# Patient Record
Sex: MALE | Race: WHITE | NOT HISPANIC OR LATINO | Employment: OTHER | URBAN - METROPOLITAN AREA
[De-identification: names, ages, dates, MRNs, and addresses within clinical notes are randomized per-mention and may not be internally consistent; named-entity substitution may affect disease eponyms.]

---

## 2023-04-06 LAB
6-ACETYLMORPHINE: <25 NG/ML
7-AMINOCLONAZEPAM: <25 NG/ML
ALPHA-HYDROXYALPRAZOLAM: <25 NG/ML
ALPHA-HYDROXYMIDAZOLAM: <25 NG/ML
ALPRAZOLAM: <25 NG/ML
AMPHETAMINE (PRESENCE) IN URINE BY SCREEN METHOD: ABNORMAL
BARBITURATES PRESENCE IN URINE BY SCREEN METHOD: ABNORMAL
CANNABINOIDS IN URINE BY SCREEN METHOD: ABNORMAL
CHLORDIAZEPOXIDE: <25 NG/ML
CLONAZEPAM: <25 NG/ML
COCAINE (PRESENCE) IN URINE BY SCREEN METHOD: ABNORMAL
CODEINE: <50 NG/ML
CREATINE, URINE FOR DRUG: 197.8 MG/DL
DIAZEPAM: <25 NG/ML
DRUG SCREEN COMMENT URINE: ABNORMAL
EDDP: <25 NG/ML
FENTANYL CONFIRMATION, URINE: <2.5 NG/ML
HYDROCODONE: >2500 NG/ML
HYDROMORPHONE: 442 NG/ML
LORAZEPAM: <25 NG/ML
METHADONE CONFIRMATION,URINE: <25 NG/ML
MIDAZOLAM: <25 NG/ML
MORPHINE URINE: <50 NG/ML
NORDIAZEPAM: <25 NG/ML
NORFENTANYL: <2.5 NG/ML
NORHYDROCODONE: >1000 NG/ML
NOROXYCODONE: <25 NG/ML
O-DESMETHYLTRAMADOL: <50 NG/ML
OXAZEPAM: <25 NG/ML
OXYCODONE: <25 NG/ML
OXYMORPHONE: <25 NG/ML
PHENCYCLIDINE (PRESENCE) IN URINE BY SCREEN METHOD: ABNORMAL
TEMAZEPAM: <25 NG/ML
TRAMADOL: <50 NG/ML
ZOLPIDEM METABOLITE (ZCA): <25 NG/ML
ZOLPIDEM: <25 NG/ML

## 2023-08-18 PROBLEM — F32.A DEPRESSION: Status: ACTIVE | Noted: 2023-08-18

## 2023-08-18 PROBLEM — K50.00 CROHN'S DISEASE OF SMALL INTESTINE (MULTI): Status: ACTIVE | Noted: 2023-08-18

## 2023-08-18 PROBLEM — M79.671 CHRONIC PAIN OF BOTH FEET: Status: ACTIVE | Noted: 2023-08-18

## 2023-08-18 PROBLEM — R74.8 LIVER ENZYME ELEVATION: Status: ACTIVE | Noted: 2023-08-18

## 2023-08-18 PROBLEM — R20.2 PARESTHESIA OF BOTH FEET: Status: ACTIVE | Noted: 2023-08-18

## 2023-08-18 PROBLEM — M19.041 PRIMARY OSTEOARTHRITIS OF BOTH HANDS: Status: ACTIVE | Noted: 2023-08-18

## 2023-08-18 PROBLEM — R79.9 ABNORMAL BLOOD CHEMISTRY: Status: ACTIVE | Noted: 2023-08-18

## 2023-08-18 PROBLEM — R07.9 CHEST PAIN: Status: ACTIVE | Noted: 2023-08-18

## 2023-08-18 PROBLEM — R20.0 NUMBNESS: Status: ACTIVE | Noted: 2023-08-18

## 2023-08-18 PROBLEM — N28.9 RENAL DISORDER: Status: ACTIVE | Noted: 2023-08-18

## 2023-08-18 PROBLEM — M19.042 PRIMARY OSTEOARTHRITIS OF BOTH HANDS: Status: ACTIVE | Noted: 2023-08-18

## 2023-08-18 PROBLEM — R94.31 ABNORMAL EKG: Status: ACTIVE | Noted: 2023-08-18

## 2023-08-18 PROBLEM — M15.9 OSTEOARTHRITIS, MULTIPLE SITES: Status: ACTIVE | Noted: 2023-08-18

## 2023-08-18 PROBLEM — R20.2 PARESTHESIA OF BOTH HANDS: Status: ACTIVE | Noted: 2023-08-18

## 2023-08-18 PROBLEM — K50.90 CROHN'S DISEASE (MULTI): Status: ACTIVE | Noted: 2023-08-18

## 2023-08-18 PROBLEM — E55.9 VITAMIN D DEFICIENCY: Status: ACTIVE | Noted: 2023-08-18

## 2023-08-18 PROBLEM — B02.9 SHINGLES: Status: ACTIVE | Noted: 2023-08-18

## 2023-08-18 PROBLEM — R22.32 FINGER MASS, LEFT: Status: ACTIVE | Noted: 2023-08-18

## 2023-08-18 PROBLEM — I10 HYPERTENSION: Status: ACTIVE | Noted: 2023-08-18

## 2023-08-18 PROBLEM — E03.9 HYPOTHYROIDISM: Status: ACTIVE | Noted: 2023-08-18

## 2023-08-18 PROBLEM — M94.9 DISORDER OF BONE AND CARTILAGE: Status: ACTIVE | Noted: 2023-08-18

## 2023-08-18 PROBLEM — M07.60 ENTEROPATHIC ARTHRITIS: Status: ACTIVE | Noted: 2023-08-18

## 2023-08-18 PROBLEM — G89.29 CHRONIC PAIN OF BOTH FEET: Status: ACTIVE | Noted: 2023-08-18

## 2023-08-18 PROBLEM — G56.00 CARPAL TUNNEL SYNDROME: Status: ACTIVE | Noted: 2023-08-18

## 2023-08-18 PROBLEM — M85.89 OTHER SPECIFIED DISORDERS OF BONE DENSITY AND STRUCTURE, MULTIPLE SITES: Status: ACTIVE | Noted: 2023-08-18

## 2023-08-18 PROBLEM — F33.1 MODERATE EPISODE OF RECURRENT MAJOR DEPRESSIVE DISORDER (MULTI): Status: ACTIVE | Noted: 2023-08-18

## 2023-08-18 PROBLEM — G89.29 CHRONIC PAIN: Status: ACTIVE | Noted: 2023-08-18

## 2023-08-18 PROBLEM — M79.672 CHRONIC PAIN OF BOTH FEET: Status: ACTIVE | Noted: 2023-08-18

## 2023-08-18 PROBLEM — M89.9 DISORDER OF BONE AND CARTILAGE: Status: ACTIVE | Noted: 2023-08-18

## 2023-08-18 PROBLEM — G47.00 INSOMNIA: Status: ACTIVE | Noted: 2023-08-18

## 2023-08-18 RX ORDER — TRAZODONE HYDROCHLORIDE 50 MG/1
TABLET ORAL
COMMUNITY
Start: 2023-04-03

## 2023-08-18 RX ORDER — NALOXONE HYDROCHLORIDE 4 MG/.1ML
4 SPRAY NASAL AS NEEDED
COMMUNITY
Start: 2020-09-08

## 2023-08-18 RX ORDER — VENLAFAXINE 37.5 MG/1
1 TABLET ORAL 2 TIMES DAILY
COMMUNITY
Start: 2021-06-14 | End: 2024-02-06 | Stop reason: ALTCHOICE

## 2023-08-18 RX ORDER — HYDROCODONE BITARTRATE AND ACETAMINOPHEN 5; 325 MG/1; MG/1
1 TABLET ORAL EVERY 8 HOURS PRN
COMMUNITY
Start: 2022-09-01 | End: 2023-10-02 | Stop reason: SDUPTHER

## 2023-09-06 PROBLEM — M25.541 PAIN OF JOINT OF BOTH HANDS: Status: ACTIVE | Noted: 2023-09-06

## 2023-09-06 PROBLEM — R63.0 LOSS OF APPETITE: Status: ACTIVE | Noted: 2023-09-06

## 2023-09-06 PROBLEM — M79.646 FINGER PAIN: Status: ACTIVE | Noted: 2023-09-06

## 2023-09-06 PROBLEM — K56.0 PARALYTIC ILEUS (MULTI): Status: ACTIVE | Noted: 2023-09-06

## 2023-09-06 PROBLEM — R07.81 RIB PAIN: Status: ACTIVE | Noted: 2023-09-06

## 2023-09-06 PROBLEM — R10.11 RUQ ABDOMINAL PAIN: Status: ACTIVE | Noted: 2023-09-06

## 2023-09-06 PROBLEM — S09.90XA INJURY OF HEAD: Status: ACTIVE | Noted: 2023-09-06

## 2023-09-06 PROBLEM — R21 RASH: Status: ACTIVE | Noted: 2023-09-06

## 2023-09-06 PROBLEM — S61.419A LACERATION OF HAND: Status: ACTIVE | Noted: 2023-09-06

## 2023-09-06 PROBLEM — R10.9 ABDOMINAL PAIN: Status: ACTIVE | Noted: 2023-09-06

## 2023-09-06 PROBLEM — L08.9 INFECTION OF HAND: Status: ACTIVE | Noted: 2023-09-06

## 2023-09-06 PROBLEM — M25.542 PAIN OF JOINT OF BOTH HANDS: Status: ACTIVE | Noted: 2023-09-06

## 2023-09-06 PROBLEM — R11.2 NAUSEA & VOMITING: Status: ACTIVE | Noted: 2023-09-06

## 2023-09-06 PROBLEM — M25.549 PAIN, JOINT, HAND: Status: ACTIVE | Noted: 2023-09-06

## 2023-09-06 PROBLEM — M54.9 BACKACHE: Status: ACTIVE | Noted: 2023-09-06

## 2023-09-06 PROBLEM — M25.539 WRIST PAIN: Status: ACTIVE | Noted: 2023-09-06

## 2023-09-06 PROBLEM — R30.0 DYSURIA: Status: ACTIVE | Noted: 2023-09-06

## 2023-09-06 PROBLEM — I82.90 VENOUS THROMBOSIS: Status: ACTIVE | Noted: 2023-09-06

## 2023-09-06 PROBLEM — E86.0 DEHYDRATION: Status: ACTIVE | Noted: 2023-09-06

## 2023-09-06 RX ORDER — INFLIXIMAB 100 MG/10ML
5 INJECTION, POWDER, LYOPHILIZED, FOR SOLUTION INTRAVENOUS ONCE
COMMUNITY

## 2023-09-06 RX ORDER — DULOXETIN HYDROCHLORIDE 60 MG/1
1 CAPSULE, DELAYED RELEASE ORAL DAILY
COMMUNITY
Start: 2023-08-24 | End: 2024-02-06 | Stop reason: ALTCHOICE

## 2023-10-02 DIAGNOSIS — M07.60 ENTEROPATHIC ARTHRITIS: Primary | ICD-10-CM

## 2023-10-02 RX ORDER — HYDROCODONE BITARTRATE AND ACETAMINOPHEN 5; 325 MG/1; MG/1
1 TABLET ORAL EVERY 8 HOURS PRN
Qty: 120 TABLET | Refills: 0 | Status: SHIPPED | OUTPATIENT
Start: 2023-10-02 | End: 2023-11-01 | Stop reason: SDUPTHER

## 2023-10-03 RX ORDER — HYDROCODONE BITARTRATE AND ACETAMINOPHEN 5; 325 MG/1; MG/1
1 TABLET ORAL EVERY 8 HOURS PRN
Qty: 20 TABLET | Refills: 0 | OUTPATIENT
Start: 2023-10-03

## 2023-10-17 PROBLEM — M46.50: Status: ACTIVE | Noted: 2023-10-17

## 2023-10-24 DIAGNOSIS — M46.50: Primary | ICD-10-CM

## 2023-10-24 RX ORDER — ALBUTEROL SULFATE 0.83 MG/ML
3 SOLUTION RESPIRATORY (INHALATION) AS NEEDED
Status: CANCELLED | OUTPATIENT
Start: 2023-10-25

## 2023-10-24 RX ORDER — EPINEPHRINE 0.3 MG/.3ML
0.3 INJECTION SUBCUTANEOUS EVERY 5 MIN PRN
Status: CANCELLED | OUTPATIENT
Start: 2023-10-25

## 2023-10-24 RX ORDER — FAMOTIDINE 10 MG/ML
20 INJECTION INTRAVENOUS ONCE AS NEEDED
Status: CANCELLED | OUTPATIENT
Start: 2023-10-25

## 2023-10-24 RX ORDER — DIPHENHYDRAMINE HYDROCHLORIDE 50 MG/ML
50 INJECTION INTRAMUSCULAR; INTRAVENOUS AS NEEDED
Status: CANCELLED | OUTPATIENT
Start: 2023-10-25

## 2023-10-25 ENCOUNTER — INFUSION (OUTPATIENT)
Dept: INFUSION THERAPY | Facility: CLINIC | Age: 68
End: 2023-10-25
Payer: MEDICARE

## 2023-10-25 VITALS
DIASTOLIC BLOOD PRESSURE: 78 MMHG | RESPIRATION RATE: 18 BRPM | WEIGHT: 144.6 LBS | TEMPERATURE: 97.5 F | OXYGEN SATURATION: 99 % | HEART RATE: 67 BPM | SYSTOLIC BLOOD PRESSURE: 134 MMHG | BODY MASS INDEX: 20.17 KG/M2

## 2023-10-25 DIAGNOSIS — M46.50: ICD-10-CM

## 2023-10-25 PROCEDURE — 2500000004 HC RX 250 GENERAL PHARMACY W/ HCPCS (ALT 636 FOR OP/ED): Performed by: INTERNAL MEDICINE

## 2023-10-25 PROCEDURE — 96413 CHEMO IV INFUSION 1 HR: CPT | Performed by: INTERNAL MEDICINE

## 2023-10-25 PROCEDURE — 96413 CHEMO IV INFUSION 1 HR: CPT

## 2023-10-25 RX ORDER — ALBUTEROL SULFATE 0.83 MG/ML
3 SOLUTION RESPIRATORY (INHALATION) AS NEEDED
Status: CANCELLED | OUTPATIENT
Start: 2023-12-20

## 2023-10-25 RX ORDER — EPINEPHRINE 0.3 MG/.3ML
0.3 INJECTION SUBCUTANEOUS EVERY 5 MIN PRN
Status: CANCELLED | OUTPATIENT
Start: 2023-12-20

## 2023-10-25 RX ORDER — FAMOTIDINE 10 MG/ML
20 INJECTION INTRAVENOUS ONCE AS NEEDED
Status: CANCELLED | OUTPATIENT
Start: 2023-12-20

## 2023-10-25 RX ORDER — DIPHENHYDRAMINE HYDROCHLORIDE 50 MG/ML
50 INJECTION INTRAMUSCULAR; INTRAVENOUS AS NEEDED
Status: CANCELLED | OUTPATIENT
Start: 2023-12-20

## 2023-10-25 RX ADMIN — SODIUM CHLORIDE 500 MG: 9 INJECTION, SOLUTION INTRAVENOUS at 09:24

## 2023-10-25 ASSESSMENT — ENCOUNTER SYMPTOMS
LOSS OF SENSATION IN FEET: 0
DEPRESSION: 0
OCCASIONAL FEELINGS OF UNSTEADINESS: 0

## 2023-10-25 NOTE — PATIENT INSTRUCTIONS
Today you received: ( free text drug name and dose, including premedication's)  Infliximab    For:   1. Other infective spondylopathies, site unspecified (CMS/[facility])          Please read the  Medication Guide that was given to you and reviewed during todays visit.     (Tell all doctors including dentists that you are taking this medication)     Go to the emergency room or call 911 if:  -You have signs of allergic reaction:   o         Rash, hives, itching.   o         Swollen, blistered, peeling skin.   o         Swelling of face, lips, mouth, tongue or throat.   o         Tightness of chest, trouble breathing, swallowing or talking      Call your doctor:     - If IV / injection site gets red, warm, swollen, itchy or leaks fluid or pus.     (Leave dressing on your IV site for at least 2 hours and keep area clean and dry  - If you get sick or have symptoms of infection or are not feeling well for any reason.    (Wash your hands often, stay away from people who are sick)  - If you have side effects from your medication that do not go away or are bothersome.     (Refer to the teaching your nurse gave you for side effects to call your doctor about)     Common side effects may include:  stuffy nose, headache, feeling tired, muscle aches, upset stomach  - Before receiving any vaccines, Call the [facility] at   if:  - You get sick, are on antibiotics, have had a recent vaccine, have surgery or dental work and your doctor wants your visit rescheduled.  - You need to cancel and reschedule your visit for any reason. Call at least 2 days before your visit if you need to cancel.   - Your insurance changes before your next visit.    (We will need to get approval from your new insurance. This can take up to two weeks.)     The [facility] is opened Monday thru Friday. We are closed on weekends and holidays.     Voice mail will take your call if the center is closed. If you leave a message please  allow 24 hours for a call back during weekdays. If you leave a message on a weekend/holiday, we will call you back the next business day.

## 2023-10-25 NOTE — PROGRESS NOTES
[facility]   infusion Clinic Note   Date: 2023   Name: Vineet Hightower  : 1955   MRN: [id number]         Reason for Visit:   OP Infusion (Infliximab)      Accompanied by:Self   Visit Type:: Infusion   Diagnosis: Other infective spondylopathies, site unspecified (CMS/HCC)    Allergies:   Allergies as of 10/25/2023    (No Known Allergies)      Current Meds has a current medication list which includes the following prescription(s): duloxetine, hydrocodone-acetaminophen, infliximab, naloxone, trazodone, and venlafaxine.        Vitals:  Vitals:    10/25/23 0914   BP: 136/74   Pulse: 80   Resp: 18   Temp: 36.3 °C (97.3 °F)   SpO2: 99%   Weight: 65.6 kg (144 lb 9.6 oz)      Infusion Pre-procedure Checklist   Allergies reviewed: yes   Medications reviewed: yes   Contraindications to treatment: no   Previous reaction to current treatment:no   Current Health Issues: None   Pain: '    Is the pain different from normal: no   Is the pain tolerable: yes   Is your Doctor aware: n/a   Contraindications based on patient history: no   Provider notified: Not applicable   Labs: None   Fall Risk Screening:      Review of Systems - Oncology   Negative for complaint: [] all other systems have been reviewed and are negative for complaint   Infusion Readiness:   Assessment Concerns Related to Infusion: No  Provider notified: n/a  Assess patient for the concerns below. Document provider notification as appropriate:  - Does not meet criteria to treat N/A  - Has an active or recent infection with/without current antibiotic use N/A  - Has recent/planned dental work N/A  - Has recent/planned surgeries N/A  - Has recently received or plans to receive vaccinations N/A  - Has treatment related toxicities N/A  - Is pregnant (unless noted otherwise) N/A    Initiated By: Katrina Haney RN   Time: 9:25 AM     We administered inFLIXimab-dyyb (Inflectra) 500 mg in sodium chloride 0.9% 250 mL IV*.

## 2023-11-01 DIAGNOSIS — M07.60 ENTEROPATHIC ARTHRITIS: ICD-10-CM

## 2023-11-01 RX ORDER — HYDROCODONE BITARTRATE AND ACETAMINOPHEN 5; 325 MG/1; MG/1
1 TABLET ORAL EVERY 8 HOURS PRN
Qty: 90 TABLET | Refills: 0 | Status: SHIPPED | OUTPATIENT
Start: 2023-11-01 | End: 2023-12-13 | Stop reason: SDUPTHER

## 2023-11-06 ENCOUNTER — OFFICE VISIT (OUTPATIENT)
Dept: RHEUMATOLOGY | Facility: CLINIC | Age: 68
End: 2023-11-06
Payer: MEDICARE

## 2023-11-06 VITALS — DIASTOLIC BLOOD PRESSURE: 60 MMHG | SYSTOLIC BLOOD PRESSURE: 108 MMHG | WEIGHT: 144 LBS | BODY MASS INDEX: 20.08 KG/M2

## 2023-11-06 DIAGNOSIS — M07.60 ENTEROPATHIC ARTHRITIS: Primary | ICD-10-CM

## 2023-11-06 DIAGNOSIS — G89.29 OTHER CHRONIC PAIN: ICD-10-CM

## 2023-11-06 DIAGNOSIS — K50.919 CROHN'S DISEASE WITH COMPLICATION, UNSPECIFIED GASTROINTESTINAL TRACT LOCATION (MULTI): ICD-10-CM

## 2023-11-06 PROCEDURE — 99214 OFFICE O/P EST MOD 30 MIN: CPT | Mod: PO | Performed by: INTERNAL MEDICINE

## 2023-11-06 PROCEDURE — 1159F MED LIST DOCD IN RCRD: CPT | Performed by: INTERNAL MEDICINE

## 2023-11-06 PROCEDURE — 3074F SYST BP LT 130 MM HG: CPT | Performed by: INTERNAL MEDICINE

## 2023-11-06 PROCEDURE — 99214 OFFICE O/P EST MOD 30 MIN: CPT | Performed by: INTERNAL MEDICINE

## 2023-11-06 PROCEDURE — 3078F DIAST BP <80 MM HG: CPT | Performed by: INTERNAL MEDICINE

## 2023-11-06 NOTE — PROGRESS NOTES
"Recheck  OA  /  Enteropathic Arthritis  Doing well       HPI - \"not too bad\"  He is on duloxetine from pain mgmt, which is helping some.  Hydrocodone helps  - he states he couldn't get OOB without it.  Finger and ankle tenderness.  Hands swell with use.   AM stiffness ?duration.   No CP or resp.  He had a crohns flare 1.5 wks ago with terrible diarrhea x 2-3 days.      PE  NAD  RRR no r/m/g  CTA  No edema  No synovitis    A/P - OA, enteropathic arthritis, chronic pain - stable  Recent brief crohns flare - GI if sx incr  Check screening DEXA - no parental hip fx  Check labs  Reeval 3 mo    "

## 2023-12-13 DIAGNOSIS — M07.60 ENTEROPATHIC ARTHRITIS: ICD-10-CM

## 2023-12-13 RX ORDER — HYDROCODONE BITARTRATE AND ACETAMINOPHEN 5; 325 MG/1; MG/1
1 TABLET ORAL EVERY 8 HOURS PRN
Qty: 90 TABLET | Refills: 0 | Status: SHIPPED | OUTPATIENT
Start: 2023-12-13 | End: 2024-01-11 | Stop reason: SDUPTHER

## 2023-12-20 ENCOUNTER — INFUSION (OUTPATIENT)
Dept: INFUSION THERAPY | Facility: CLINIC | Age: 68
End: 2023-12-20
Payer: MEDICARE

## 2023-12-20 VITALS
WEIGHT: 148 LBS | HEART RATE: 71 BPM | TEMPERATURE: 97 F | OXYGEN SATURATION: 100 % | DIASTOLIC BLOOD PRESSURE: 68 MMHG | RESPIRATION RATE: 18 BRPM | BODY MASS INDEX: 20.64 KG/M2 | SYSTOLIC BLOOD PRESSURE: 133 MMHG

## 2023-12-20 DIAGNOSIS — M46.50: ICD-10-CM

## 2023-12-20 DIAGNOSIS — M07.60 ENTEROPATHIC ARTHRITIS: ICD-10-CM

## 2023-12-20 DIAGNOSIS — K50.919 CROHN'S DISEASE WITH COMPLICATION, UNSPECIFIED GASTROINTESTINAL TRACT LOCATION (MULTI): ICD-10-CM

## 2023-12-20 LAB
ALBUMIN SERPL BCP-MCNC: 3.7 G/DL (ref 3.4–5)
ALP SERPL-CCNC: 114 U/L (ref 33–136)
ALT SERPL W P-5'-P-CCNC: 20 U/L (ref 10–52)
AST SERPL W P-5'-P-CCNC: 15 U/L (ref 9–39)
BASOPHILS # BLD AUTO: 0.1 X10*3/UL (ref 0–0.1)
BASOPHILS NFR BLD AUTO: 1.7 %
BILIRUB DIRECT SERPL-MCNC: 0.1 MG/DL (ref 0–0.3)
BILIRUB SERPL-MCNC: 0.4 MG/DL (ref 0–1.2)
CREAT SERPL-MCNC: 1.82 MG/DL (ref 0.5–1.3)
CRP SERPL-MCNC: 1.95 MG/DL
EOSINOPHIL # BLD AUTO: 0.17 X10*3/UL (ref 0–0.7)
EOSINOPHIL NFR BLD AUTO: 2.9 %
ERYTHROCYTE [DISTWIDTH] IN BLOOD BY AUTOMATED COUNT: 14.1 % (ref 11.5–14.5)
GFR SERPL CREATININE-BSD FRML MDRD: 40 ML/MIN/1.73M*2
HCT VFR BLD AUTO: 46.8 % (ref 41–52)
HGB BLD-MCNC: 15.3 G/DL (ref 13.5–17.5)
IMM GRANULOCYTES # BLD AUTO: 0.02 X10*3/UL (ref 0–0.7)
IMM GRANULOCYTES NFR BLD AUTO: 0.3 % (ref 0–0.9)
LYMPHOCYTES # BLD AUTO: 2.42 X10*3/UL (ref 1.2–4.8)
LYMPHOCYTES NFR BLD AUTO: 40.9 %
MCH RBC QN AUTO: 32.2 PG (ref 26–34)
MCHC RBC AUTO-ENTMCNC: 32.7 G/DL (ref 32–36)
MCV RBC AUTO: 99 FL (ref 80–100)
MONOCYTES # BLD AUTO: 1.06 X10*3/UL (ref 0.1–1)
MONOCYTES NFR BLD AUTO: 17.9 %
NEUTROPHILS # BLD AUTO: 2.14 X10*3/UL (ref 1.2–7.7)
NEUTROPHILS NFR BLD AUTO: 36.3 %
NRBC BLD-RTO: 0 /100 WBCS (ref 0–0)
PLATELET # BLD AUTO: 244 X10*3/UL (ref 150–450)
PROT SERPL-MCNC: 6.9 G/DL (ref 6.4–8.2)
RBC # BLD AUTO: 4.75 X10*6/UL (ref 4.5–5.9)
WBC # BLD AUTO: 5.9 X10*3/UL (ref 4.4–11.3)

## 2023-12-20 PROCEDURE — 85025 COMPLETE CBC W/AUTO DIFF WBC: CPT | Performed by: INTERNAL MEDICINE

## 2023-12-20 PROCEDURE — 80076 HEPATIC FUNCTION PANEL: CPT | Performed by: INTERNAL MEDICINE

## 2023-12-20 PROCEDURE — 82565 ASSAY OF CREATININE: CPT | Performed by: INTERNAL MEDICINE

## 2023-12-20 PROCEDURE — 86140 C-REACTIVE PROTEIN: CPT | Performed by: INTERNAL MEDICINE

## 2023-12-20 PROCEDURE — 96413 CHEMO IV INFUSION 1 HR: CPT | Performed by: INTERNAL MEDICINE

## 2023-12-20 PROCEDURE — 2500000004 HC RX 250 GENERAL PHARMACY W/ HCPCS (ALT 636 FOR OP/ED): Performed by: INTERNAL MEDICINE

## 2023-12-20 PROCEDURE — 36415 COLL VENOUS BLD VENIPUNCTURE: CPT

## 2023-12-20 RX ORDER — DIPHENHYDRAMINE HYDROCHLORIDE 50 MG/ML
50 INJECTION INTRAMUSCULAR; INTRAVENOUS AS NEEDED
Status: CANCELLED | OUTPATIENT
Start: 2024-02-14

## 2023-12-20 RX ORDER — EPINEPHRINE 0.3 MG/.3ML
0.3 INJECTION SUBCUTANEOUS EVERY 5 MIN PRN
Status: CANCELLED | OUTPATIENT
Start: 2024-02-14

## 2023-12-20 RX ORDER — ALBUTEROL SULFATE 0.83 MG/ML
3 SOLUTION RESPIRATORY (INHALATION) AS NEEDED
Status: CANCELLED | OUTPATIENT
Start: 2024-02-14

## 2023-12-20 RX ORDER — FAMOTIDINE 10 MG/ML
20 INJECTION INTRAVENOUS ONCE AS NEEDED
Status: CANCELLED | OUTPATIENT
Start: 2024-02-14

## 2023-12-20 RX ADMIN — SODIUM CHLORIDE 500 MG: 9 INJECTION, SOLUTION INTRAVENOUS at 10:52

## 2023-12-20 NOTE — PROGRESS NOTES
[facility]   infusion Clinic Note   Date: 2023   Name: Vineet Hightower  : 1955   MRN: [id number]         Reason for Visit: OP Infusion (Infliximab)         Visit Type: INFUSION      Ordered By: Maye      Accompanied by:Self      Diagnosis: Other infective spondylopathies, site unspecified (CMS/[facility])    Enteropathic arthritis    Crohn's disease with complication, unspecified gastrointestinal tract location (CMS/[facility])       Allergies:   Allergies as of 2023    (No Known Allergies)         Current Medications has a current medication list which includes the following prescription(s): duloxetine, hydrocodone-acetaminophen, infliximab, naloxone, trazodone, and venlafaxine.       Vitals:  Vitals:    23 1029   BP: 114/72   Pulse: 81   Resp: 18   Temp: 36.3 °C (97.3 °F)   SpO2: 96%             Infusion Pre-procedure Checklist:   - Allergies reviewed: yes   - Medications reviewed: yes       - Previous reaction to current treatment: no      Assess patient for the concerns below. Document provider notification as appropriate.  - Active or recent infection with/without current antibiotic use: no  - Recent or planned invasive dental work: no  - Recent or planned surgeries: no  - Recently received or plans to receive vaccinations: no  - Has treatment related toxicities: no  - Is pregnant:  no      Pain: 0   - Is the pain different from normal: no   - Is the pain tolerable: no   - Is your Doctor aware:  no      Labs: Labs drawn and sent per order         Fall Risk Screening:         Review Of Systems:  Review of Systems - Oncology      Infusion Readiness:   - Assessment Concerns Related to Infusion: No  - Provider notified: no      Document Below Only If Indicated:   New Patient Education:    N/A (returning patient for continuation of therapy. Ongoing education provided as needed.)        Treatment Conditions & Drug Specific Questions:    InFLIXimab  (AVSOLA, INFLECTRA,  "REMICADE, RENFLEXIS)    (Unless otherwise specified on patient specific therapy plan):     TREATMENT CONDITIONS:  Unless otherwise specified on patient specific therapy plan HOLD and notify Provider prior to proceeding with today's infusion if patient with:  o Positive T-Spot  o Reactive Hep B sAg    No results found for: \"TBSIN\", \"TBGRES\", \"QFG\", \"TSPOTR\"   No results found for: \"HAGCN\", \"HEPBSURABI\", \"HBSAG\", \"XHAGF\", \"HEPBSAG\", \"EXTHEPBSAG\", \"NONUHSWGH\"   No results found for: \"NONUHFIRE\", \"NONUHSWGH\", \"NONUHFISH\", \"EXTHEPBSAG\"    Labs reviewed and patient meets treatment conditions? Yes    REMINDER:   WEIGHT BASED DRUG     Recommended Vitals/Observation:  Vitals:     Induction: Obtain vital signs every 30 minutes; at end of observation period and as needed.     Maintenance: Obtain vital signs at start and end of infusions  Observation:     Induction: Patient is monitored for 30 minutes post-infusion     Maintenance: No observation.        Weight Based Drug Calculations:    WEIGHT BASED DRUGS: Infliximab (REMICADE, INFLECTRA, RENFLEXIS)   Patient's dosing weight (kg): 70.4     10% weight variance for prescribed treatment: 63.36 kg to 77.44 kg     Patient's weight today: 67kg       weight range for prescribed dose:     Patient weight today falls outside of 10% variance or  weight range: No     Home Care pharmacist informed of weight variance: Not applicable    Doses that are weight based have an acceptable variance rule within 10% of the prescribed   order and/or within  weight range. If patient weight on day of infusion falls   outside of the 10% variance, or weight range, infusion is administered and   pharmacy contacted regarding future dosing adjustments, per policy.         Initiated By: Katrina Haney RN   Time: 12:06 PM     We administered inFLIXimab-dyyb (Inflectra) 500 mg in sodium chloride 0.9% 250 mL IV*.      "

## 2023-12-20 NOTE — PATIENT INSTRUCTIONS
Today :We administered inFLIXimab-dyyb (Inflectra) 500 mg in sodium chloride 0.9% 250 mL IV*.     For:   1. Other infective spondylopathies, site unspecified (CMS/[facility])    2. Enteropathic arthritis    3. Crohn's disease with complication, unspecified gastrointestinal tract location (CMS/[facility])         Your next appointment is due in:  2/14/2024        Please read the  Medication Guide that was given to you and reviewed during todays visit.     (Tell all doctors including dentists that you are taking this medication)     Go to the emergency room or call 911 if:  -You have signs of allergic reaction:   -Rash, hives, itching.   -Swollen, blistered, peeling skin.   -Swelling of face, lips, mouth, tongue or throat.   -Tightness of chest, trouble breathing, swallowing or talking     Call your doctor:  - If IV / injection site gets red, warm, swollen, itchy or leaks fluid or pus.     (Leave dressing on your IV site for at least 2 hours and keep area clean and dry  - If you get sick or have symptoms of infection or are not feeling well for any reason.    (Wash your hands often, stay away from people who are sick)  - If you have side effects from your medication that do not go away or are bothersome.     (Refer to the teaching your nurse gave you for side effects to call your doctor about)    - Common side effects may include:  stuffy nose, headache, feeling tired, muscle aches, upset stomach  - Before receiving any vaccines     - Call [facility] at   If:  - You get sick, are on antibiotics, have had a recent vaccine, have surgery or dental work and your doctor wants your visit rescheduled.  - You need to cancel and reschedule your visit for any reason. Call at least 2 days before your visit if you need to cancel.   - Your insurance changes before your next visit.    (We will need to get approval from your new insurance. This can take up to two weeks.)     [facility] is opened Monday  thru Friday. We are closed on weekends and holidays.   Voice mail will take your call if the center is closed. If you leave a message please allow 24 hours for a call back during weekdays. If you leave a message on a weekend/holiday, we will call you back the next business day.

## 2024-01-11 DIAGNOSIS — M07.60 ENTEROPATHIC ARTHRITIS: ICD-10-CM

## 2024-01-11 RX ORDER — HYDROCODONE BITARTRATE AND ACETAMINOPHEN 5; 325 MG/1; MG/1
1 TABLET ORAL EVERY 8 HOURS PRN
Qty: 90 TABLET | Refills: 0 | Status: SHIPPED | OUTPATIENT
Start: 2024-01-12 | End: 2024-02-06 | Stop reason: SDUPTHER

## 2024-02-06 ENCOUNTER — OFFICE VISIT (OUTPATIENT)
Dept: RHEUMATOLOGY | Facility: CLINIC | Age: 69
End: 2024-02-06
Payer: MEDICARE

## 2024-02-06 VITALS — SYSTOLIC BLOOD PRESSURE: 118 MMHG | DIASTOLIC BLOOD PRESSURE: 70 MMHG | BODY MASS INDEX: 19.53 KG/M2 | WEIGHT: 148 LBS

## 2024-02-06 DIAGNOSIS — M15.9 OSTEOARTHRITIS OF MULTIPLE JOINTS, UNSPECIFIED OSTEOARTHRITIS TYPE: Primary | ICD-10-CM

## 2024-02-06 DIAGNOSIS — G89.29 OTHER CHRONIC PAIN: ICD-10-CM

## 2024-02-06 DIAGNOSIS — M07.60 ENTEROPATHIC ARTHRITIS: ICD-10-CM

## 2024-02-06 PROCEDURE — 3078F DIAST BP <80 MM HG: CPT | Performed by: INTERNAL MEDICINE

## 2024-02-06 PROCEDURE — 1159F MED LIST DOCD IN RCRD: CPT | Performed by: INTERNAL MEDICINE

## 2024-02-06 PROCEDURE — 99214 OFFICE O/P EST MOD 30 MIN: CPT | Performed by: INTERNAL MEDICINE

## 2024-02-06 PROCEDURE — 3074F SYST BP LT 130 MM HG: CPT | Performed by: INTERNAL MEDICINE

## 2024-02-06 RX ORDER — HYDROCODONE BITARTRATE AND ACETAMINOPHEN 5; 325 MG/1; MG/1
1 TABLET ORAL EVERY 8 HOURS PRN
Qty: 90 TABLET | Refills: 0 | Status: SHIPPED | OUTPATIENT
Start: 2024-02-11 | End: 2024-03-11 | Stop reason: SDUPTHER

## 2024-02-06 NOTE — PROGRESS NOTES
"Recheck  OA  /  Enteropathic Arthritis   Doing well      HPI \"not bad - about the same\" - incr stiffness with cold weather.  Hands hurt and feel stiff with use.  He never feels like his joints have loosened up.  Hydrocodone helped.   Only occ has hand swelling.  No CP or resp.  Sl crohns flare today thinks d/t eating stuffed pepper    PE  NAD  RRR no r/m/g  CTA  No edema  No synovitis    A/P - OA and enteropathic arthritis - stable  Reviewed prev labs  No need to check labs today  Check screening DEXA - no parental hip fx  Reeval 3 mo    "

## 2024-02-10 ENCOUNTER — DOCUMENTATION (OUTPATIENT)
Dept: INFUSION THERAPY | Facility: CLINIC | Age: 69
End: 2024-02-10
Payer: MEDICARE

## 2024-02-10 DIAGNOSIS — K50.919 CROHN'S DISEASE WITH COMPLICATION, UNSPECIFIED GASTROINTESTINAL TRACT LOCATION (MULTI): Primary | ICD-10-CM

## 2024-02-10 NOTE — PROGRESS NOTES
"  Patient to be scheduled for Continuation of Infliximab infusions.  Diagnosis: Crohns   Dosing is weight based at: 7 mg/kg  Dosing weight of: 70.4 kg  For a total dose of: 500 mg every 8 weeks thereafter (maintenance)    Labs… TB and HEP B SAG ordered for next [facility] infusion appt.   TB drawn/results: No results found for: \"TBSIN\", \"TBGRES\", \"QFG\", \"TSPOTR\"   Hep B SAg drawn/results: No results found for: \"HAGCN\", \"HEPBSURABI\", \"HBSAG\", \"XHAGF\", \"HEPBSAG\", \"EXTHEPBSAG\", \"NONUHSWGH\"   Hep B Core antibody: No results found for: \"HBCTI\", \"HEPBCAB\"  No results found for: \"HEPATOT\", \"HEPAIGM\", \"HEPBCIGM\", \"HEPBCAB\", \"HBEAG\", \"HEPCAB\"   CBC drawn: (if available)   Lab Results   Component Value Date    WBC 5.9 12/20/2023    HGB 15.3 12/20/2023    HCT 46.8 12/20/2023    MCV 99 12/20/2023     12/20/2023      CMP drawn: (if available)     Chemistry    Lab Results   Component Value Date/Time     01/17/2023 0915    K 5.1 01/17/2023 0915     (H) 01/17/2023 0915    CO2 22 01/17/2023 0915    BUN 23 01/17/2023 0915    CREATININE 1.82 (H) 12/20/2023 1049    Lab Results   Component Value Date/Time    CALCIUM 8.6 01/17/2023 0915    ALKPHOS 114 12/20/2023 1049    AST 15 12/20/2023 1049    ALT 20 12/20/2023 1049    BILITOT 0.4 12/20/2023 1049           CRP drawn: (If available)   Lab Results   Component Value Date    CRP 1.95 (H) 12/20/2023        Last infusion received: 12/20/2023 (if continuation)   Due: 2/14/2024    Induction and Maintenance Therapy Plans entered if needed? Yes (if no prescribing provider notified of need to enter)    This result meets treatment criteria.    "

## 2024-02-16 ENCOUNTER — TELEPHONE (OUTPATIENT)
Dept: RHEUMATOLOGY | Facility: CLINIC | Age: 69
End: 2024-02-16
Payer: MEDICARE

## 2024-02-28 ENCOUNTER — APPOINTMENT (OUTPATIENT)
Dept: INFUSION THERAPY | Facility: CLINIC | Age: 69
End: 2024-02-28
Payer: MEDICARE

## 2024-02-28 ENCOUNTER — INFUSION (OUTPATIENT)
Dept: INFUSION THERAPY | Facility: CLINIC | Age: 69
End: 2024-02-28
Payer: MEDICARE

## 2024-02-28 VITALS
SYSTOLIC BLOOD PRESSURE: 121 MMHG | OXYGEN SATURATION: 97 % | WEIGHT: 146.6 LBS | BODY MASS INDEX: 19.34 KG/M2 | HEART RATE: 72 BPM | DIASTOLIC BLOOD PRESSURE: 79 MMHG | TEMPERATURE: 97.3 F | RESPIRATION RATE: 16 BRPM

## 2024-02-28 DIAGNOSIS — M46.50: ICD-10-CM

## 2024-02-28 DIAGNOSIS — K50.019 CROHN'S DISEASE OF SMALL INTESTINE WITH COMPLICATION (MULTI): ICD-10-CM

## 2024-02-28 DIAGNOSIS — K50.919 CROHN'S DISEASE WITH COMPLICATION, UNSPECIFIED GASTROINTESTINAL TRACT LOCATION (MULTI): ICD-10-CM

## 2024-02-28 PROCEDURE — 96413 CHEMO IV INFUSION 1 HR: CPT | Performed by: NURSE PRACTITIONER

## 2024-02-28 PROCEDURE — 87340 HEPATITIS B SURFACE AG IA: CPT

## 2024-02-28 PROCEDURE — 36415 COLL VENOUS BLD VENIPUNCTURE: CPT

## 2024-02-28 RX ORDER — ALBUTEROL SULFATE 0.83 MG/ML
3 SOLUTION RESPIRATORY (INHALATION) AS NEEDED
Status: CANCELLED | OUTPATIENT
Start: 2024-04-10

## 2024-02-28 RX ORDER — FAMOTIDINE 10 MG/ML
20 INJECTION INTRAVENOUS ONCE AS NEEDED
Status: CANCELLED | OUTPATIENT
Start: 2024-04-10

## 2024-02-28 RX ORDER — EPINEPHRINE 0.3 MG/.3ML
0.3 INJECTION SUBCUTANEOUS EVERY 5 MIN PRN
Status: CANCELLED | OUTPATIENT
Start: 2024-04-10

## 2024-02-28 RX ORDER — DIPHENHYDRAMINE HYDROCHLORIDE 50 MG/ML
50 INJECTION INTRAMUSCULAR; INTRAVENOUS AS NEEDED
Status: CANCELLED | OUTPATIENT
Start: 2024-04-10

## 2024-02-28 ASSESSMENT — PAIN SCALES - GENERAL: PAINLEVEL: 0-NO PAIN

## 2024-02-28 ASSESSMENT — ENCOUNTER SYMPTOMS: DIARRHEA: 1

## 2024-02-28 NOTE — PROGRESS NOTES
[facility]   infusion Clinic Note   Date: 2024   Name: Vineet Hihgtower  : 1955   MRN: [id number]         Reason for Visit: OP Infusion (Inflectra)         Visit Type: INFUSION       Ordered By: Dr. Villavicencio      Accompanied by:Self      Diagnosis: Crohn's disease of small intestine with complication (CMS/HCC)    Other infective spondylopathies, site unspecified (CMS/HCC)    Crohn's disease with complication, unspecified gastrointestinal tract location (CMS/HCC)       Allergies:   Allergies as of 2024    (No Known Allergies)         Current Medications has a current medication list which includes the following prescription(s): hydrocodone-acetaminophen, infliximab, naloxone, and trazodone.       Vitals:   Vitals:    24 0944 24 1109 24 1152   BP: 142/67 128/75 121/79   Pulse: 72 68 72   Resp: 16 16 16   Temp: 36.4 °C (97.6 °F) 36.7 °C (98.1 °F) 36.3 °C (97.3 °F)   TempSrc: Temporal     SpO2: 100% 97% 97%   Weight: 66.5 kg (146 lb 9.6 oz)     PainSc: 0-No pain               Infusion Pre-procedure Checklist:   - Allergies reviewed: yes   - Medications reviewed: yes       - Previous reaction to current treatment: no      Assess patient for the concerns below. Document provider notification as appropriate.  - Active or recent infection with/without current antibiotic use: no  - Recent or planned invasive dental work: no  - Recent or planned surgeries: no  - Recently received or plans to receive vaccinations: no  - Has treatment related toxicities: no  - Is pregnant:  n/a      Pain: 0   - Is the pain different from normal: no   - Is the pain tolerable: yes   - Is your Doctor aware:  n/a      Labs: Labs drawn and sent per order         Fall Risk Screening: Daysi Fall Risk  History of Falling, Immediate or Within 3 Months: No  Secondary Diagnosis: Yes  Ambulatory Aid: Walks without aid/bedrest/nurse assist  Intravenous Therapy/Heparin Lock:  "Yes  Gait/Transferring: Normal/bedrest/immobile  Mental Status: Oriented to own ability  Tavarez Fall Risk Score: 35       Review Of Systems:  Review of Systems   Gastrointestinal:  Positive for diarrhea.   All other systems reviewed and are negative.        Infusion Readiness:   - Assessment Concerns Related to Infusion: No  - Provider notified: n/a      Document Below Only If Indicated:   New Patient Education:    N/A (returning patient for continuation of therapy. Ongoing education provided as needed.)        Treatment Conditions & Drug Specific Questions:    InFLIXimab  (AVSOLA, INFLECTRA, REMICADE, RENFLEXIS)    (Unless otherwise specified on patient specific therapy plan):     TREATMENT CONDITIONS:  Unless otherwise specified on patient specific therapy plan HOLD and notify Provider prior to proceeding with today's infusion if patient with:  o Positive T-Spot  o Reactive Hep B sAg and/or Hep B Core Antibody    No results found for: \"TBSIN\", \"TBGRES\", \"QFG\", \"TSPOTR\"   No results found for: \"HAGCN\", \"HEPBSURABI\", \"HBSAG\", \"XHAGF\", \"HEPBSAG\", \"EXTHEPBSAG\", \"NONUHSWGH\"   No results found for: \"NONUHFIRE\", \"NONUHSWGH\", \"NONUHFISH\", \"EXTHEPBSAG\"  No results found for: \"HBCTI\", \"HEPBCAB\"  No results found for: \"HEPATOT\", \"HEPAIGM\", \"HEPBCIGM\", \"HEPBCAB\", \"HBEAG\", \"HEPCAB\"     Labs reviewed and patient meets treatment conditions? Yes    REMINDER:   WEIGHT BASED DRUG     Recommended Vitals/Observation:  Vitals:     Induction: Obtain vital signs every 30 minutes; at end of observation period and as needed.     Maintenance: Obtain vital signs at start and end of infusions  Observation:     Induction: Patient is monitored for 30 minutes post-infusion     Maintenance: No observation.        Weight Based Drug Calculations:    WEIGHT BASED DRUGS: Infliximab (REMICADE, INFLECTRA, RENFLEXIS)   Patient's dosing weight (kg): 70.4 kg     10% weight variance for prescribed treatment: 63.36 kg to 77.44 kg kg     Patient's weight " today:   Vitals:    02/28/24 0944   Weight: 66.5 kg (146 lb 9.6 oz)         weight range for prescribed dose:     Patient weight today falls outside of 10% variance or  weight range: No     Home Care pharmacist informed of weight variance: Not applicable    Doses that are weight based have an acceptable variance rule within 10% of the prescribed   order and/or within  weight range. If patient weight on day of infusion falls   outside of the 10% variance, or weight range, infusion is administered and   pharmacy contacted regarding future dosing adjustments, per policy.         Initiated By: Linus Mercado RN   Time: 11:54 AM     We administered inFLIXimab-dyyb (Inflectra) 500 mg in sodium chloride 0.9% 250 mL IV*.   Line flushed with 25ml NS after infusion completed.

## 2024-02-28 NOTE — PATIENT INSTRUCTIONS
Today :We administered inFLIXimab-dyyb (Inflectra) 500 mg in sodium chloride 0.9% 250 mL IV*.     For:   1. Crohn's disease of small intestine with complication (CMS/[facility])    2. Other infective spondylopathies, site unspecified (CMS/[facility])    3. Crohn's disease with complication, unspecified gastrointestinal tract location (CMS/[facility])         Your next appointment is due in:  56 days        Please read the  Medication Guide that was given to you and reviewed during todays visit.     (Tell all doctors including dentists that you are taking this medication)     Go to the emergency room or call 911 if:  -You have signs of allergic reaction:   -Rash, hives, itching.   -Swollen, blistered, peeling skin.   -Swelling of face, lips, mouth, tongue or throat.   -Tightness of chest, trouble breathing, swallowing or talking     Call your doctor:  - If IV / injection site gets red, warm, swollen, itchy or leaks fluid or pus.     (Leave dressing on your IV site for at least 2 hours and keep area clean and dry  - If you get sick or have symptoms of infection or are not feeling well for any reason.    (Wash your hands often, stay away from people who are sick)  - If you have side effects from your medication that do not go away or are bothersome.     (Refer to the teaching your nurse gave you for side effects to call your doctor about)    - Common side effects may include:  stuffy nose, headache, feeling tired, muscle aches, upset stomach  - Before receiving any vaccines     - Call the [facility] at   If:  - You get sick, are on antibiotics, have had a recent vaccine, have surgery or dental work and your doctor wants your visit rescheduled.  - You need to cancel and reschedule your visit for any reason. Call at least 2 days before your visit if you need to cancel.   - Your insurance changes before your next visit.    (We will need to get approval from your new insurance. This can take up to two weeks.)     The  [facility] is opened Monday thru Friday. We are closed on weekends and holidays.   Voice mail will take your call if the center is closed. If you leave a message please allow 24 hours for a call back during weekdays. If you leave a message on a weekend/holiday, we will call you back the next business day.

## 2024-02-29 LAB — HBV SURFACE AG SERPL QL IA: NONREACTIVE

## 2024-03-11 DIAGNOSIS — M07.60 ENTEROPATHIC ARTHRITIS: ICD-10-CM

## 2024-03-11 RX ORDER — HYDROCODONE BITARTRATE AND ACETAMINOPHEN 5; 325 MG/1; MG/1
1 TABLET ORAL EVERY 8 HOURS PRN
Qty: 90 TABLET | Refills: 0 | Status: SHIPPED | OUTPATIENT
Start: 2024-03-14 | End: 2024-04-10 | Stop reason: SDUPTHER

## 2024-04-10 DIAGNOSIS — M07.60 ENTEROPATHIC ARTHRITIS: ICD-10-CM

## 2024-04-10 RX ORDER — HYDROCODONE BITARTRATE AND ACETAMINOPHEN 5; 325 MG/1; MG/1
1 TABLET ORAL EVERY 8 HOURS PRN
Qty: 90 TABLET | Refills: 0 | Status: SHIPPED | OUTPATIENT
Start: 2024-04-13 | End: 2024-05-07 | Stop reason: SDUPTHER

## 2024-04-24 ENCOUNTER — INFUSION (OUTPATIENT)
Dept: INFUSION THERAPY | Facility: CLINIC | Age: 69
End: 2024-04-24
Payer: MEDICARE

## 2024-04-24 VITALS
HEART RATE: 71 BPM | TEMPERATURE: 97.6 F | OXYGEN SATURATION: 99 % | SYSTOLIC BLOOD PRESSURE: 117 MMHG | BODY MASS INDEX: 19.26 KG/M2 | WEIGHT: 146 LBS | RESPIRATION RATE: 16 BRPM | DIASTOLIC BLOOD PRESSURE: 51 MMHG

## 2024-04-24 DIAGNOSIS — M46.50: ICD-10-CM

## 2024-04-24 DIAGNOSIS — K50.019 CROHN'S DISEASE OF SMALL INTESTINE WITH COMPLICATION (MULTI): ICD-10-CM

## 2024-04-24 DIAGNOSIS — K50.919 CROHN'S DISEASE WITH COMPLICATION, UNSPECIFIED GASTROINTESTINAL TRACT LOCATION (MULTI): ICD-10-CM

## 2024-04-24 PROCEDURE — 96413 CHEMO IV INFUSION 1 HR: CPT | Performed by: NURSE PRACTITIONER

## 2024-04-24 RX ORDER — FAMOTIDINE 10 MG/ML
20 INJECTION INTRAVENOUS ONCE AS NEEDED
OUTPATIENT
Start: 2024-06-19

## 2024-04-24 RX ORDER — DIPHENHYDRAMINE HYDROCHLORIDE 50 MG/ML
50 INJECTION INTRAMUSCULAR; INTRAVENOUS AS NEEDED
OUTPATIENT
Start: 2024-06-19

## 2024-04-24 RX ORDER — EPINEPHRINE 0.3 MG/.3ML
0.3 INJECTION SUBCUTANEOUS EVERY 5 MIN PRN
OUTPATIENT
Start: 2024-06-19

## 2024-04-24 RX ORDER — ALBUTEROL SULFATE 0.83 MG/ML
3 SOLUTION RESPIRATORY (INHALATION) AS NEEDED
OUTPATIENT
Start: 2024-06-19

## 2024-04-24 ASSESSMENT — ENCOUNTER SYMPTOMS
SLEEP DISTURBANCE: 1
ARTHRALGIAS: 1
APPETITE CHANGE: 1

## 2024-04-24 ASSESSMENT — PAIN SCALES - GENERAL: PAINLEVEL: 5

## 2024-04-24 NOTE — PATIENT INSTRUCTIONS
Today :We administered inFLIXimab-dyyb (Inflectra) 500 mg in sodium chloride 0.9% 250 mL IV*.     For:   1. Crohn's disease of small intestine with complication ([facility])    2. Other infective spondylopathies, site unspecified (CMS-HCC)    3. Crohn's disease with complication, unspecified gastrointestinal tract location ([facility])         Your next appointment is due in:  6/19/2024        Please read the  Medication Guide that was given to you and reviewed during todays visit.     (Tell all doctors including dentists that you are taking this medication)     Go to the emergency room or call 911 if:  -You have signs of allergic reaction:   -Rash, hives, itching.   -Swollen, blistered, peeling skin.   -Swelling of face, lips, mouth, tongue or throat.   -Tightness of chest, trouble breathing, swallowing or talking     Call your doctor:  - If IV / injection site gets red, warm, swollen, itchy or leaks fluid or pus.     (Leave dressing on your IV site for at least 2 hours and keep area clean and dry  - If you get sick or have symptoms of infection or are not feeling well for any reason.    (Wash your hands often, stay away from people who are sick)  - If you have side effects from your medication that do not go away or are bothersome.     (Refer to the teaching your nurse gave you for side effects to call your doctor about)    - Common side effects may include:  stuffy nose, headache, feeling tired, muscle aches, upset stomach  - Before receiving any vaccines     - Call the [facility] at   If:  - You get sick, are on antibiotics, have had a recent vaccine, have surgery or dental work and your doctor wants your visit rescheduled.  - You need to cancel and reschedule your visit for any reason. Call at least 2 days before your visit if you need to cancel.   - Your insurance changes before your next visit.    (We will need to get approval from your new insurance. This can take up to two weeks.)     The  [facility] is opened Monday thru Friday. We are closed on weekends and holidays.   Voice mail will take your call if the center is closed. If you leave a message please allow 24 hours for a call back during weekdays. If you leave a message on a weekend/holiday, we will call you back the next business day.

## 2024-04-24 NOTE — PROGRESS NOTES
[facility]   Infusion Clinic Note   Date: 2024   Name: Vineet Hightower  : 1955   MRN: [id number]         Reason for Visit: OP Infusion (Infliximab Infusion)         Visit Type: INFUSION-Rapid Infusion every 8 weeks    Last regular rate in the regimen, this is dose 4.    Ordered By: Dr. MIYA Merida      Accompanied by:Self      Diagnosis: Crohn's disease of small intestine with complication (Multi)    Other infective spondylopathies, site unspecified (CMS-HCC)    Crohn's disease with complication, unspecified gastrointestinal tract location (Multi)       Allergies:   Allergies as of 2024    (No Known Allergies)         Current Medications has a current medication list which includes the following prescription(s): hydrocodone-acetaminophen, infliximab, naloxone, and trazodone.       Vitals:   Vitals:    24 0949 24 1015 24 1030 24 1115   BP: 123/61 122/60 125/54 117/51   Pulse: 73 71 69 71   Resp: 18 17 17 16   Temp: 36.3 °C (97.4 °F) 36.2 °C (97.2 °F) 36.7 °C (98 °F) 36.4 °C (97.6 °F)   TempSrc: Temporal      SpO2: 99%      Weight: 66.2 kg (146 lb)      PainSc:   5      PainLoc: Generalized                Infusion Pre-procedure Checklist:   - Allergies reviewed: yes   - Medications reviewed: yes       - Previous reaction to current treatment: no      Assess patient for the concerns below. Document provider notification as appropriate.  - Active or recent infection with/without current antibiotic use: no  - Recent or planned invasive dental work: no  - Recent or planned surgeries: no  - Recently received or plans to receive vaccinations: no  - Has treatment related toxicities: no  - Is pregnant:  n/a      Pain: 5   - Is the pain different from normal: no   - Is the pain tolerable: yes   - Is your Doctor aware:  yes      Labs:  reviewed         Fall Risk Screening: Daysi Fall Risk  History of Falling, Immediate or Within 3 Months: No  Secondary  "Diagnosis: Yes  Ambulatory Aid: Walks without aid/bedrest/nurse assist  Intravenous Therapy/Heparin Lock: No  Gait/Transferring: Normal/bedrest/immobile  Mental Status: Oriented to own ability  [place] Fall Risk Score: 15       Review Of Systems:  Review of Systems   Constitutional:  Positive for appetite change.        Patient reports appetite improving   Musculoskeletal:  Positive for arthralgias.   Psychiatric/Behavioral:  Positive for sleep disturbance.    All other systems reviewed and are negative.        Infusion Readiness:   - Assessment Concerns Related to Infusion: No  - Provider notified: no      Document Below Only If Indicated:   New Patient Education:    N/A (returning patient for continuation of therapy. Ongoing education provided as needed.)        Treatment Conditions & Drug Specific Questions:    InFLIXimab  (AVSOLA, INFLECTRA, REMICADE, RENFLEXIS)    (Unless otherwise specified on patient specific therapy plan):     TREATMENT CONDITIONS:  Unless otherwise specified on patient specific therapy plan HOLD and notify Provider prior to proceeding with today's infusion if patient with:  o Positive T-Spot  o Reactive Hep B sAg and/or Hep B Core Antibody    No results found for: \"TBSIN\", \"TBGRES\", \"QFG\", \"TSPOTR\"   Lab Results   Component Value Date    HEPBSAG Nonreactive 02/28/2024      No results found for: \"NONUHFIRE\", \"NONUHSWGH\", \"NONUHFISH\", \"EXTHEPBSAG\"  No results found for: \"HBCTI\", \"HEPBCAB\"  No results found for: \"HEPATOT\", \"HEPAIGM\", \"HEPBCIGM\", \"HEPBCAB\", \"HBEAG\", \"HEPCAB\"     Labs reviewed and patient meets treatment conditions? Yes    DRUG SPECIFIC QUESTIONS:    Any new or worsening signs / symptoms of heart failure which may include things like worsening shortness of breath, swelling, fatigue? No   (If yes notify provider before proceeding with today's infusion. New onset or worsening HF have been reported with infliximab)    REMINDER:   WEIGHT BASED DRUG     Recommended " Vitals/Observation:  Vitals:     Induction: Obtain vital signs every 30 minutes; at end of observation period and as needed.     Maintenance: Obtain vital signs at start and end of infusions  Observation:     Induction: Patient is monitored for 30 minutes post-infusion     Maintenance: No observation.        Weight Based Drug Calculations:    WEIGHT BASED DRUGS: Infliximab (REMICADE, INFLECTRA, RENFLEXIS)   Patient's dosing weight (kg): 70.4     10% weight variance for prescribed treatment: 63.3 kg to 77.4 kg     Patient's weight today:   Vitals:    04/24/24 0949   Weight: 66.2 kg (146 lb)         weight range for prescribed dose:     Patient weight today falls outside of 10% variance or  weight range: No     Home Care pharmacist informed of weight variance: No    Doses that are weight based have an acceptable variance rule within 10% of the prescribed   order and/or within  weight range. If patient weight on day of infusion falls   outside of the 10% variance, or weight range, infusion is administered and   pharmacy contacted regarding future dosing adjustments, per policy.         Initiated By: Amina Rodas RN   Time: 11:30 AM     We administered inFLIXimab-dyyb (Inflectra) 500 mg in sodium chloride 0.9% 250 mL IV*.   1130 Patient tolerated rapid infusion well.  VSS.  No s/s adverse reaction.  RTC on 6/19/2024.

## 2024-05-07 ENCOUNTER — OFFICE VISIT (OUTPATIENT)
Dept: RHEUMATOLOGY | Facility: CLINIC | Age: 69
End: 2024-05-07
Payer: MEDICARE

## 2024-05-07 ENCOUNTER — LAB (OUTPATIENT)
Dept: LAB | Facility: LAB | Age: 69
End: 2024-05-07
Payer: MEDICARE

## 2024-05-07 VITALS — BODY MASS INDEX: 19.53 KG/M2 | DIASTOLIC BLOOD PRESSURE: 68 MMHG | WEIGHT: 148 LBS | SYSTOLIC BLOOD PRESSURE: 118 MMHG

## 2024-05-07 DIAGNOSIS — M15.9 OSTEOARTHRITIS OF MULTIPLE JOINTS, UNSPECIFIED OSTEOARTHRITIS TYPE: ICD-10-CM

## 2024-05-07 DIAGNOSIS — M07.60 ENTEROPATHIC ARTHRITIS: Primary | ICD-10-CM

## 2024-05-07 DIAGNOSIS — M85.89 OTHER SPECIFIED DISORDERS OF BONE DENSITY AND STRUCTURE, MULTIPLE SITES: ICD-10-CM

## 2024-05-07 DIAGNOSIS — G89.29 OTHER CHRONIC PAIN: ICD-10-CM

## 2024-05-07 DIAGNOSIS — Z79.899 MEDICATION MANAGEMENT: ICD-10-CM

## 2024-05-07 LAB
AMPHETAMINES UR QL SCN: NORMAL
BARBITURATES UR QL SCN: NORMAL
BZE UR QL SCN: NORMAL
CANNABINOIDS UR QL SCN: NORMAL
CREAT UR-MCNC: 169.2 MG/DL (ref 20–370)
PCP UR QL SCN: NORMAL

## 2024-05-07 PROCEDURE — 80361 OPIATES 1 OR MORE: CPT

## 2024-05-07 PROCEDURE — 80365 DRUG SCREENING OXYCODONE: CPT

## 2024-05-07 PROCEDURE — 80358 DRUG SCREENING METHADONE: CPT

## 2024-05-07 PROCEDURE — 3074F SYST BP LT 130 MM HG: CPT | Performed by: INTERNAL MEDICINE

## 2024-05-07 PROCEDURE — 82570 ASSAY OF URINE CREATININE: CPT

## 2024-05-07 PROCEDURE — 80307 DRUG TEST PRSMV CHEM ANLYZR: CPT

## 2024-05-07 PROCEDURE — 80346 BENZODIAZEPINES1-12: CPT

## 2024-05-07 PROCEDURE — 1159F MED LIST DOCD IN RCRD: CPT | Performed by: INTERNAL MEDICINE

## 2024-05-07 PROCEDURE — 80354 DRUG SCREENING FENTANYL: CPT

## 2024-05-07 PROCEDURE — 3078F DIAST BP <80 MM HG: CPT | Performed by: INTERNAL MEDICINE

## 2024-05-07 PROCEDURE — 80368 SEDATIVE HYPNOTICS: CPT

## 2024-05-07 PROCEDURE — 99214 OFFICE O/P EST MOD 30 MIN: CPT | Performed by: INTERNAL MEDICINE

## 2024-05-07 PROCEDURE — 80373 DRUG SCREENING TRAMADOL: CPT

## 2024-05-07 RX ORDER — HYDROCODONE BITARTRATE AND ACETAMINOPHEN 5; 325 MG/1; MG/1
1 TABLET ORAL EVERY 8 HOURS PRN
Qty: 90 TABLET | Refills: 0 | Status: SHIPPED | OUTPATIENT
Start: 2024-05-13 | End: 2024-06-10 | Stop reason: SDUPTHER

## 2024-05-07 NOTE — PROGRESS NOTES
Recheck OA  /  Enteropathic Arthritis   doing well.    HPI - usual arthritis pain - hydrocodone helps.  AM stiffness can last for hrs.  Hand pain incr with use.  Crohns better - flares aren't as freq.  No CP or resp.      PE  NAD  RRR no r/m/mg  CTA  No edema  No synovitis    A/P - OA and enteropathic arthritis, stable  Check screening DEXA.  No parental or personal fx  Reviewed prev labs - stable CRF  No need to check labs today  Reeval 3 mo    Addendum - contacted by infusion ctr that pt is refusing infliximab because he feels good and will talk about it with me at next appt

## 2024-05-09 LAB
1OH-MIDAZOLAM UR CFM-MCNC: <25 NG/ML
6MAM UR CFM-MCNC: <25 NG/ML
7AMINOCLONAZEPAM UR CFM-MCNC: <25 NG/ML
A-OH ALPRAZ UR CFM-MCNC: <25 NG/ML
ALPRAZ UR CFM-MCNC: <25 NG/ML
CHLORDIAZEP UR CFM-MCNC: <25 NG/ML
CLONAZEPAM UR CFM-MCNC: <25 NG/ML
CODEINE UR CFM-MCNC: <50 NG/ML
DIAZEPAM UR CFM-MCNC: <25 NG/ML
EDDP UR CFM-MCNC: <25 NG/ML
FENTANYL UR CFM-MCNC: <2.5 NG/ML
HYDROCODONE CTO UR CFM-MCNC: >2500 NG/ML
HYDROMORPHONE UR CFM-MCNC: 394 NG/ML
LORAZEPAM UR CFM-MCNC: <25 NG/ML
METHADONE UR CFM-MCNC: <25 NG/ML
MIDAZOLAM UR CFM-MCNC: <25 NG/ML
MORPHINE UR CFM-MCNC: <50 NG/ML
NORDIAZEPAM UR CFM-MCNC: <25 NG/ML
NORFENTANYL UR CFM-MCNC: <2.5 NG/ML
NORHYDROCODONE UR CFM-MCNC: >1000 NG/ML
NOROXYCODONE UR CFM-MCNC: <25 NG/ML
NORTRAMADOL UR-MCNC: <50 NG/ML
OXAZEPAM UR CFM-MCNC: <25 NG/ML
OXYCODONE UR CFM-MCNC: <25 NG/ML
OXYMORPHONE UR CFM-MCNC: <25 NG/ML
TEMAZEPAM UR CFM-MCNC: <25 NG/ML
TRAMADOL UR CFM-MCNC: <50 NG/ML
ZOLPIDEM UR CFM-MCNC: <25 NG/ML
ZOLPIDEM UR-MCNC: <25 NG/ML

## 2024-06-10 DIAGNOSIS — M07.60 ENTEROPATHIC ARTHRITIS: ICD-10-CM

## 2024-06-10 RX ORDER — HYDROCODONE BITARTRATE AND ACETAMINOPHEN 5; 325 MG/1; MG/1
1 TABLET ORAL EVERY 8 HOURS PRN
Qty: 90 TABLET | Refills: 0 | Status: SHIPPED | OUTPATIENT
Start: 2024-06-12

## 2024-06-19 ENCOUNTER — APPOINTMENT (OUTPATIENT)
Dept: INFUSION THERAPY | Facility: CLINIC | Age: 69
End: 2024-06-19
Payer: MEDICARE

## 2024-07-10 DIAGNOSIS — M07.60 ENTEROPATHIC ARTHRITIS: ICD-10-CM

## 2024-07-10 RX ORDER — HYDROCODONE BITARTRATE AND ACETAMINOPHEN 5; 325 MG/1; MG/1
1 TABLET ORAL EVERY 8 HOURS PRN
Qty: 90 TABLET | Refills: 0 | Status: SHIPPED | OUTPATIENT
Start: 2024-07-12

## 2024-08-07 ENCOUNTER — OFFICE VISIT (OUTPATIENT)
Dept: RHEUMATOLOGY | Facility: CLINIC | Age: 69
End: 2024-08-07
Payer: MEDICARE

## 2024-08-07 VITALS — SYSTOLIC BLOOD PRESSURE: 108 MMHG | WEIGHT: 154 LBS | BODY MASS INDEX: 20.32 KG/M2 | DIASTOLIC BLOOD PRESSURE: 64 MMHG

## 2024-08-07 DIAGNOSIS — G89.29 OTHER CHRONIC PAIN: Primary | ICD-10-CM

## 2024-08-07 DIAGNOSIS — M07.60 ENTEROPATHIC ARTHRITIS: ICD-10-CM

## 2024-08-07 PROCEDURE — 3074F SYST BP LT 130 MM HG: CPT | Performed by: INTERNAL MEDICINE

## 2024-08-07 PROCEDURE — 99214 OFFICE O/P EST MOD 30 MIN: CPT | Performed by: INTERNAL MEDICINE

## 2024-08-07 PROCEDURE — 3078F DIAST BP <80 MM HG: CPT | Performed by: INTERNAL MEDICINE

## 2024-08-07 PROCEDURE — 1159F MED LIST DOCD IN RCRD: CPT | Performed by: INTERNAL MEDICINE

## 2024-08-07 NOTE — PROGRESS NOTES
"Recheck  OA  / Enteropathic Arthritis   Off Remicade x 1 month.   \" I wanted to see how I did without use \"  Doing well     HPI - He decided to stop remicade because he feels good.  He states he is doing well off remicade, but he continues to need vicodin because he gets pain and swelling when he uses his \"My hands will start to swell and throb\" and his ankles swell.  He hasn't had a flare of his crohns in 1 mo.  He has a lot of fatigue.  AM stiffness 1 hr \"very.  Real stiff, where I'm bent over like an old man\"  No CP or SOB    PE  NAD  RRR no r/m/g  CTA  No edema  No synovitis  Finger tenderness     A/P - OA, chronic pain, and enteropathic arthritis - pt off remicade x 1 mo because he wants to see if he still needs it.  If he is able to stay off it without difficulty, then he wouldn't need to see me regularly, and I would no longer treat his chronic pain.  He agrees to Follow up in 3 mo to reeval and see if he does need to continue to see me    "